# Patient Record
Sex: FEMALE | Race: WHITE | NOT HISPANIC OR LATINO | Employment: STUDENT | URBAN - METROPOLITAN AREA
[De-identification: names, ages, dates, MRNs, and addresses within clinical notes are randomized per-mention and may not be internally consistent; named-entity substitution may affect disease eponyms.]

---

## 2018-10-23 ENCOUNTER — HOSPITAL ENCOUNTER (EMERGENCY)
Facility: HOSPITAL | Age: 23
Discharge: HOME | End: 2018-10-23
Attending: EMERGENCY MEDICINE | Admitting: EMERGENCY MEDICINE

## 2018-10-23 ENCOUNTER — APPOINTMENT (OUTPATIENT)
Dept: GENERAL RADIOLOGY | Facility: HOSPITAL | Age: 23
End: 2018-10-23

## 2018-10-23 VITALS
BODY MASS INDEX: 31.18 KG/M2 | OXYGEN SATURATION: 96 % | SYSTOLIC BLOOD PRESSURE: 130 MMHG | WEIGHT: 176 LBS | RESPIRATION RATE: 18 BRPM | TEMPERATURE: 98.6 F | HEART RATE: 87 BPM | HEIGHT: 63 IN | DIASTOLIC BLOOD PRESSURE: 73 MMHG

## 2018-10-23 DIAGNOSIS — R07.89 ATYPICAL CHEST PAIN: Primary | ICD-10-CM

## 2018-10-23 DIAGNOSIS — R00.2 PALPITATIONS: ICD-10-CM

## 2018-10-23 DIAGNOSIS — H91.90 HEARING LOSS, UNSPECIFIED HEARING LOSS TYPE, UNSPECIFIED LATERALITY: ICD-10-CM

## 2018-10-23 LAB
ALBUMIN SERPL-MCNC: 4.72 G/DL (ref 3.2–4.8)
ALBUMIN/GLOB SERPL: 2.1 G/DL (ref 1.5–2.5)
ALP SERPL-CCNC: 59 U/L (ref 25–100)
ALT SERPL W P-5'-P-CCNC: 23 U/L (ref 7–40)
ANION GAP SERPL CALCULATED.3IONS-SCNC: 10 MMOL/L (ref 3–11)
AST SERPL-CCNC: 21 U/L (ref 0–33)
B-HCG UR QL: NEGATIVE
BASOPHILS # BLD AUTO: 0.02 10*3/MM3 (ref 0–0.2)
BASOPHILS NFR BLD AUTO: 0.2 % (ref 0–1)
BILIRUB SERPL-MCNC: 0.2 MG/DL (ref 0.3–1.2)
BILIRUB UR QL STRIP: NEGATIVE
BUN BLD-MCNC: 10 MG/DL (ref 9–23)
BUN/CREAT SERPL: 14.3 (ref 7–25)
CALCIUM SPEC-SCNC: 9.6 MG/DL (ref 8.7–10.4)
CHLORIDE SERPL-SCNC: 105 MMOL/L (ref 99–109)
CLARITY UR: CLEAR
CO2 SERPL-SCNC: 22 MMOL/L (ref 20–31)
COLOR UR: YELLOW
CREAT BLD-MCNC: 0.7 MG/DL (ref 0.6–1.3)
DEPRECATED RDW RBC AUTO: 40.2 FL (ref 37–54)
EOSINOPHIL # BLD AUTO: 0.1 10*3/MM3 (ref 0–0.3)
EOSINOPHIL NFR BLD AUTO: 1.2 % (ref 0–3)
ERYTHROCYTE [DISTWIDTH] IN BLOOD BY AUTOMATED COUNT: 12.8 % (ref 11.3–14.5)
GFR SERPL CREATININE-BSD FRML MDRD: 105 ML/MIN/1.73
GLOBULIN UR ELPH-MCNC: 2.3 GM/DL
GLUCOSE BLD-MCNC: 87 MG/DL (ref 70–100)
GLUCOSE UR STRIP-MCNC: NEGATIVE MG/DL
HCT VFR BLD AUTO: 38.2 % (ref 34.5–44)
HGB BLD-MCNC: 13 G/DL (ref 11.5–15.5)
HGB UR QL STRIP.AUTO: NEGATIVE
IMM GRANULOCYTES # BLD: 0.02 10*3/MM3 (ref 0–0.03)
IMM GRANULOCYTES NFR BLD: 0.2 % (ref 0–0.6)
INTERNAL NEGATIVE CONTROL: NEGATIVE
INTERNAL POSITIVE CONTROL: POSITIVE
KETONES UR QL STRIP: NEGATIVE
LEUKOCYTE ESTERASE UR QL STRIP.AUTO: NEGATIVE
LYMPHOCYTES # BLD AUTO: 2.4 10*3/MM3 (ref 0.6–4.8)
LYMPHOCYTES NFR BLD AUTO: 28.5 % (ref 24–44)
Lab: NORMAL
MCH RBC QN AUTO: 29.1 PG (ref 27–31)
MCHC RBC AUTO-ENTMCNC: 34 G/DL (ref 32–36)
MCV RBC AUTO: 85.5 FL (ref 80–99)
MONOCYTES # BLD AUTO: 0.52 10*3/MM3 (ref 0–1)
MONOCYTES NFR BLD AUTO: 6.2 % (ref 0–12)
NEUTROPHILS # BLD AUTO: 5.37 10*3/MM3 (ref 1.5–8.3)
NEUTROPHILS NFR BLD AUTO: 63.9 % (ref 41–71)
NITRITE UR QL STRIP: NEGATIVE
PH UR STRIP.AUTO: 5.5 [PH] (ref 5–8)
PLATELET # BLD AUTO: 278 10*3/MM3 (ref 150–450)
PMV BLD AUTO: 10.5 FL (ref 6–12)
POTASSIUM BLD-SCNC: 3.4 MMOL/L (ref 3.5–5.5)
PROT SERPL-MCNC: 7 G/DL (ref 5.7–8.2)
PROT UR QL STRIP: NEGATIVE
RBC # BLD AUTO: 4.47 10*6/MM3 (ref 3.89–5.14)
SODIUM BLD-SCNC: 137 MMOL/L (ref 132–146)
SP GR UR STRIP: 1.01 (ref 1–1.03)
TROPONIN I SERPL-MCNC: 0 NG/ML (ref 0–0.07)
TSH SERPL DL<=0.05 MIU/L-ACNC: 4.87 MIU/ML (ref 0.35–5.35)
UROBILINOGEN UR QL STRIP: NORMAL
WBC NRBC COR # BLD: 8.41 10*3/MM3 (ref 3.5–10.8)

## 2018-10-23 PROCEDURE — 93005 ELECTROCARDIOGRAM TRACING: CPT | Performed by: PHYSICIAN ASSISTANT

## 2018-10-23 PROCEDURE — 84484 ASSAY OF TROPONIN QUANT: CPT

## 2018-10-23 PROCEDURE — 81003 URINALYSIS AUTO W/O SCOPE: CPT | Performed by: PHYSICIAN ASSISTANT

## 2018-10-23 PROCEDURE — 85025 COMPLETE CBC W/AUTO DIFF WBC: CPT | Performed by: PHYSICIAN ASSISTANT

## 2018-10-23 PROCEDURE — 99283 EMERGENCY DEPT VISIT LOW MDM: CPT

## 2018-10-23 PROCEDURE — 84443 ASSAY THYROID STIM HORMONE: CPT | Performed by: PHYSICIAN ASSISTANT

## 2018-10-23 PROCEDURE — 81025 URINE PREGNANCY TEST: CPT | Performed by: PHYSICIAN ASSISTANT

## 2018-10-23 PROCEDURE — 71046 X-RAY EXAM CHEST 2 VIEWS: CPT

## 2018-10-23 PROCEDURE — 80053 COMPREHEN METABOLIC PANEL: CPT | Performed by: PHYSICIAN ASSISTANT

## 2018-10-23 RX ORDER — HYDROXYZINE HYDROCHLORIDE 10 MG/1
10 TABLET, FILM COATED ORAL EVERY 6 HOURS PRN
COMMUNITY

## 2018-10-24 ENCOUNTER — OFFICE VISIT (OUTPATIENT)
Dept: CARDIOLOGY | Facility: HOSPITAL | Age: 23
End: 2018-10-24

## 2018-10-24 ENCOUNTER — HOSPITAL ENCOUNTER (OUTPATIENT)
Dept: CARDIOLOGY | Facility: HOSPITAL | Age: 23
Discharge: HOME | End: 2018-10-24
Admitting: NURSE PRACTITIONER

## 2018-10-24 ENCOUNTER — HOSPITAL ENCOUNTER (OUTPATIENT)
Dept: CARDIOLOGY | Facility: HOSPITAL | Age: 23
Discharge: HOME | End: 2018-10-24

## 2018-10-24 VITALS
OXYGEN SATURATION: 97 % | DIASTOLIC BLOOD PRESSURE: 80 MMHG | BODY MASS INDEX: 31.29 KG/M2 | SYSTOLIC BLOOD PRESSURE: 122 MMHG | HEIGHT: 63 IN | HEART RATE: 99 BPM | RESPIRATION RATE: 16 BRPM | WEIGHT: 176.6 LBS | TEMPERATURE: 99.1 F

## 2018-10-24 DIAGNOSIS — R07.9 CHEST PAIN, UNSPECIFIED TYPE: ICD-10-CM

## 2018-10-24 DIAGNOSIS — R07.9 CHEST PAIN, UNSPECIFIED TYPE: Primary | ICD-10-CM

## 2018-10-24 DIAGNOSIS — R00.2 HEART PALPITATIONS: ICD-10-CM

## 2018-10-24 DIAGNOSIS — H91.93 BILATERAL HEARING LOSS, UNSPECIFIED HEARING LOSS TYPE: ICD-10-CM

## 2018-10-24 PROBLEM — H91.90 HEARING IMPAIRED: Status: ACTIVE | Noted: 2018-10-24

## 2018-10-24 PROCEDURE — 93005 ELECTROCARDIOGRAM TRACING: CPT | Performed by: NURSE PRACTITIONER

## 2018-10-24 PROCEDURE — 99204 OFFICE O/P NEW MOD 45 MIN: CPT | Performed by: NURSE PRACTITIONER

## 2018-10-24 PROCEDURE — 0296T HC EXT ECG > 48HR TO 21 DAY RCRD W/CONECT INTL RCRD: CPT

## 2018-10-24 PROCEDURE — 0298T HOLTER MONITOR - 72 HOUR UP TO 21 DAY: CPT | Performed by: INTERNAL MEDICINE

## 2018-10-24 PROCEDURE — 93010 ELECTROCARDIOGRAM REPORT: CPT | Performed by: INTERNAL MEDICINE

## 2018-10-24 RX ORDER — OMEPRAZOLE 20 MG/1
20 CAPSULE, DELAYED RELEASE ORAL DAILY
COMMUNITY
End: 2018-11-28

## 2018-10-24 NOTE — ED PROVIDER NOTES
"Subjective   Sindi Shepard is a 23 y.o.female who presents to the ED with c/o chest pain with onset 2 weeks ago. She reports that initially she developed left sided chest pain with palpitations and dizziness when she went home from [facility]. She also reported a \"weird sensation\" to her LUE. She went to [place] twice and her PCP for this complaint who performed blood tests and EKG. She had a normal work up and discharged home, however at her last   [facility] evaluation she was started on metoprolol 25 mg half tablet BID. She notes that her chest pain, palpitations, and dizziness have been persistent which prompted her visit to the ED. She states that she has hx of anxiety however does not feel like her current episode is anxiety related. She also complains of diarrhea, cough, and rhinorrhea but denies any fever, chills, urinary sx, N/V, abd pain, or any other complaints at this time. She has a follow up appointment with Dr. Briones, Cardiology on Nov 7, 2018. She denies any family hx of CAD and denies any history of congenital heart murmer, although her sister has a heart murmur. Her last menstrual cycle was 1.5 weeks ago.     The pt is deaf and a  was used.        History provided by:  Patient   used: Yes    Chest Pain   Pain location:  L chest  Pain quality: tightness    Pain radiates to:  L arm  Pain severity:  Moderate  Onset quality:  Sudden  Timing:  Constant  Progression:  Worsening  Chronicity:  New  Relieved by:  None tried  Worsened by:  Nothing  Ineffective treatments:  None tried  Associated symptoms: cough, dizziness and palpitations    Associated symptoms: no abdominal pain, no fever, no nausea and no vomiting        Review of Systems   Constitutional: Negative for chills and fever.   HENT: Positive for rhinorrhea.    Respiratory: Positive for cough.    Cardiovascular: Positive for chest pain and palpitations.   Gastrointestinal: Positive for diarrhea. Negative for " abdominal pain, nausea and vomiting.   Genitourinary: Negative for decreased urine volume, difficulty urinating, dyspareunia, dysuria, frequency, hematuria and urgency.   Neurological: Positive for dizziness.   All other systems reviewed and are negative.      Past Medical History:   Diagnosis Date   • Hearing impaired        Allergies   Allergen Reactions   • Bactrim [Sulfamethoxazole-Trimethoprim] Hives       Past Surgical History:   Procedure Laterality Date   • EAR MASTOIDECTOMY W/ COCHLEAR IMPLANT W/ LANDMARK Right     15 YRS AGO       Family History   Problem Relation Age of Onset   • Diabetes Paternal Grandfather    • Diabetes Paternal Aunt        Social History     Social History   • Marital status: Single     Social History Main Topics   • Smoking status: Never Smoker   • Smokeless tobacco: Never Used   • Alcohol use No   • Drug use: No     Other Topics Concern   • Not on file         Objective   Physical Exam   Constitutional: She is oriented to person, place, and time. She appears well-developed and well-nourished. No distress.   Pt is deaf,  was used.   HENT:   Head: Normocephalic and atraumatic.   Nose: Nose normal.   Eyes: Conjunctivae are normal. No scleral icterus.   Neck: Normal range of motion. Neck supple.   Cardiovascular: Normal rate, regular rhythm and normal heart sounds.    No murmur heard.  No ectopy.   Pulmonary/Chest: Effort normal and breath sounds normal. No respiratory distress.   Abdominal: Soft. Bowel sounds are normal. There is no tenderness.   Neurological: She is alert and oriented to person, place, and time.   Skin: Skin is warm and dry.   Psychiatric: She has a normal mood and affect. Her behavior is normal.   Nursing note and vitals reviewed.      Procedures         ED Course  ED Course as of Oct 23 2317   Tue Oct 23, 2018   2311 CBC and chemistries were within normal limits.  TSH is normal at 4.87.  Urinalysis shows no evidence of acute infectious process or  dehydration.  Urine hCG is negative.  Chest x-ray shows no acute cardiopulmonary process.  EKG shows normal sinus rhythm and there is no evidence of ectopy or arrhythmia.  Troponin is also 0.00.  I discussed all normal results with patient and recommend close follow-up at the cardiac event monitor clinic for echocardiogram and Holter monitoring.  This was translated to her with  using sign language.  Vital signs are stable.  Patient is agreeable with above treatment plan.  [FC]      ED Course User Index  [FC] Leeann Villanueva, KATIE     Recent Results (from the past 24 hour(s))   Comprehensive Metabolic Panel    Collection Time: 10/23/18  9:28 PM   Result Value Ref Range    Glucose 87 70 - 100 mg/dL    BUN 10 9 - 23 mg/dL    Creatinine 0.70 0.60 - 1.30 mg/dL    Sodium 137 132 - 146 mmol/L    Potassium 3.4 (L) 3.5 - 5.5 mmol/L    Chloride 105 99 - 109 mmol/L    CO2 22.0 20.0 - 31.0 mmol/L    Calcium 9.6 8.7 - 10.4 mg/dL    Total Protein 7.0 5.7 - 8.2 g/dL    Albumin 4.72 3.20 - 4.80 g/dL    ALT (SGPT) 23 7 - 40 U/L    AST (SGOT) 21 0 - 33 U/L    Alkaline Phosphatase 59 25 - 100 U/L    Total Bilirubin 0.2 (L) 0.3 - 1.2 mg/dL    eGFR Non African Amer 105 >60 mL/min/1.73    Globulin 2.3 gm/dL    A/G Ratio 2.1 1.5 - 2.5 g/dL    BUN/Creatinine Ratio 14.3 7.0 - 25.0    Anion Gap 10.0 3.0 - 11.0 mmol/L   TSH    Collection Time: 10/23/18  9:28 PM   Result Value Ref Range    TSH 4.870 0.350 - 5.350 mIU/mL   CBC Auto Differential    Collection Time: 10/23/18  9:28 PM   Result Value Ref Range    WBC 8.41 3.50 - 10.80 10*3/mm3    RBC 4.47 3.89 - 5.14 10*6/mm3    Hemoglobin 13.0 11.5 - 15.5 g/dL    Hematocrit 38.2 34.5 - 44.0 %    MCV 85.5 80.0 - 99.0 fL    MCH 29.1 27.0 - 31.0 pg    MCHC 34.0 32.0 - 36.0 g/dL    RDW 12.8 11.3 - 14.5 %    RDW-SD 40.2 37.0 - 54.0 fl    MPV 10.5 6.0 - 12.0 fL    Platelets 278 150 - 450 10*3/mm3    Neutrophil % 63.9 41.0 - 71.0 %    Lymphocyte % 28.5 24.0 - 44.0 %    Monocyte % 6.2 0.0 -  12.0 %    Eosinophil % 1.2 0.0 - 3.0 %    Basophil % 0.2 0.0 - 1.0 %    Immature Grans % 0.2 0.0 - 0.6 %    Neutrophils, Absolute 5.37 1.50 - 8.30 10*3/mm3    Lymphocytes, Absolute 2.40 0.60 - 4.80 10*3/mm3    Monocytes, Absolute 0.52 0.00 - 1.00 10*3/mm3    Eosinophils, Absolute 0.10 0.00 - 0.30 10*3/mm3    Basophils, Absolute 0.02 0.00 - 0.20 10*3/mm3    Immature Grans, Absolute 0.02 0.00 - 0.03 10*3/mm3   POC Troponin, Rapid    Collection Time: 10/23/18  9:35 PM   Result Value Ref Range    Troponin I 0.00 0.00 - 0.07 ng/mL   Urinalysis With Microscopic If Indicated (No Culture) - Urine, Clean Catch    Collection Time: 10/23/18  9:51 PM   Result Value Ref Range    Color, UA Yellow Yellow, Straw    Appearance, UA Clear Clear    pH, UA 5.5 5.0 - 8.0    Specific Gravity, UA 1.007 1.001 - 1.030    Glucose, UA Negative Negative    Ketones, UA Negative Negative    Bilirubin, UA Negative Negative    Blood, UA Negative Negative    Protein, UA Negative Negative    Leuk Esterase, UA Negative Negative    Nitrite, UA Negative Negative    Urobilinogen, UA 0.2 E.U./dL 0.2 - 1.0 E.U./dL   POCT Pregnancy, Urine    Collection Time: 10/23/18 10:03 PM   Result Value Ref Range    HCG, Urine, QL Negative Negative    Lot Number [id number]     Internal Positive Control Positive     Internal Negative Control Negative      Note: In addition to lab results from this visit, the labs listed above may include labs taken at another facility or during a different encounter within the last 24 hours. Please correlate lab times with ED admission and discharge times for further clarification of the services performed during this visit.    XR Chest 2 View   Final Result   No acute findings.       THIS DOCUMENT HAS BEEN ELECTRONICALLY SIGNED BY HOWIE TEJADA MD        Vitals:    10/23/18 2040   BP: 130/73   BP Location: Left arm   Patient Position: Sitting   Pulse: 87   Resp: 18   Temp: 98.6 °F (37 °C)   TempSrc: Oral   SpO2: 96%   Weight: 79.8 kg  "(176 lb)   Height: 160 cm (63\")     Medications - No data to display  ECG/EMG Results (last 24 hours)     ** No results found for the last 24 hours. **                        [facility]    Final diagnoses:   Atypical chest pain   Palpitations   Hearing loss, unspecified hearing loss type, unspecified laterality       Documentation assistance provided by cathy Mar.  Information recorded by the scribe was done at my direction and has been verified and validated by me.     Zander Mar  10/23/18 7034       Leeann Villanueva PA-C  10/23/18 1686    "

## 2018-10-24 NOTE — PROGRESS NOTES
Subjective:     Encounter Date:10/24/2018      Patient ID: Sindi Shepard is a 23 y.o. female.    Chief Complaint: palpitations and chest pain    History of Present Illness:  Ms. Shepard [facility] at the request of Dr. Fischer/ ANTHONY Reyna.  She presented to the ED yesterday evening due to 2 weeks duration of chest pain, palpitations, and dizziness.  She has been evaluated per [facility] twice and PCP.  Symptoms have persisted despite Rx for metoprolol 25 mg 1/2 tab BID.  She adds that she frequently feels a sharp pain in the left side of her chest when these sensations occur.  The metoprolol use (about 4 doses) has not changed her symptoms thus far.    No cardiac risk factors such as HTN, HLD, FMH of CAD, tobacco use.  Mild obesity w/ BMI 31.3    A  was used for today's visit.     Past Medical History:   Diagnosis Date   • Hearing impaired        Past Surgical History:   Procedure Laterality Date   • EAR MASTOIDECTOMY W/ COCHLEAR IMPLANT W/ LANDMARK Right     15 YRS AGO   • WISDOM TOOTH EXTRACTION         Social History     Social History   • Marital status: Single     Spouse name: N/A   • Number of children: N/A   • Years of education: N/A     Occupational History   • Full time student at [employer]      Social History Main Topics   • Smoking status: Never Smoker   • Smokeless tobacco: Never Used   • Alcohol use No   • Drug use: No   • Sexual activity: Not on file     Other Topics Concern   • Not on file     Social History Narrative    Caffeine: None           Family History   Problem Relation Age of Onset   • Diabetes Paternal Grandfather    • Diabetes Paternal Aunt    • Diabetes Father    • Hyperlipidemia Father    • Hypertension Father    • Anxiety disorder Father    • Cancer Paternal Grandmother        Review of Systems   Constitution: Positive for malaise/fatigue and weight loss (10 lbs). Negative for chills, decreased appetite, diaphoresis, fever,  "weakness, night sweats and weight gain.   HENT: Positive for nosebleeds. Negative for congestion, hearing loss and hoarse voice.    Eyes: Negative for blurred vision, visual disturbance and visual halos.   Cardiovascular: Positive for chest pain (4 on scale of 1 - 10), irregular heartbeat, near-syncope, orthopnea and palpitations. Negative for claudication, cyanosis, dyspnea on exertion, leg swelling, paroxysmal nocturnal dyspnea and syncope.   Respiratory: Positive for cough (few times). Negative for hemoptysis, shortness of breath, sleep disturbances due to breathing, snoring, sputum production and wheezing.    Hematologic/Lymphatic: Negative for bleeding problem. Does not bruise/bleed easily.   Skin: Negative for dry skin, itching and rash.   Musculoskeletal: Negative for arthritis, joint pain, joint swelling and myalgias.   Gastrointestinal: Positive for diarrhea. Negative for bloating, abdominal pain, constipation, flatus, heartburn, hematemesis, hematochezia, melena, nausea and vomiting.   Genitourinary: Negative for dysuria, frequency, hematuria, nocturia and urgency.   Neurological: Positive for dizziness (sometimes) and headaches. Negative for excessive daytime sleepiness, light-headedness and loss of balance.   Psychiatric/Behavioral: Negative for depression. The patient is nervous/anxious. The patient does not have insomnia.    Allergic/Immunologic:        Seasonal allergies - spring       Vitals:    10/24/18 1105 10/24/18 1108 10/24/18 1109   BP: 126/77 126/78 122/80   BP Location: Right arm Left arm Left arm   Patient Position: Sitting Sitting Standing   Pulse: 85  99   Resp: 16     Temp: 99.1 °F (37.3 °C)     TempSrc: Temporal Artery      SpO2: 97%     Weight: 80.1 kg (176 lb 9.6 oz)     Height: 160 cm (63\")           Objective:     Physical Exam   Constitutional: She is oriented to person, place, and time. She appears well-developed and well-nourished. No distress.   HENT:   Head: Normocephalic and " atraumatic.   Eyes: Pupils are equal, round, and reactive to light. Conjunctivae are normal. No scleral icterus.   Neck: Normal range of motion. Neck supple. No JVD present.   Cardiovascular: Normal rate, regular rhythm, normal heart sounds and intact distal pulses.  Exam reveals no gallop and no friction rub.    No murmur heard.  Pulmonary/Chest: Effort normal and breath sounds normal. No respiratory distress. She has no wheezes. She has no rales. She exhibits no tenderness.   Musculoskeletal: Normal range of motion. She exhibits no edema.   Neurological: She is alert and oriented to person, place, and time.   Skin: Skin is warm and dry.   Psychiatric: She has a normal mood and affect. Her behavior is normal. Judgment and thought content normal.   Vitals reviewed.      Lab Review: ER notes, labs, ekg's     Assessment/ Plan:                  Diagnosis Plan   1. Chest pain with palpitations - ECG 12 Lead remains NSR  - Ongoing symptoms despite multiple acute work up's    -Adult Transthoracic Echo     -Lipid Panel    -Treadmill Stress Test    -Holter Monitor - 14 days  - RTC in 3 weeks for follow up  - Discontinue Lopressor for now   2. Bilateral hearing loss  - Visit utilized

## 2018-10-24 NOTE — DISCHARGE INSTRUCTIONS
ER workup revealed normal EKG, labs, including thyroid study, and normal urinalysis.  Chest x-ray was also normal.  Recommend close follow-up at the cardiac event monitor clinic here at [facility].  I feel the patient would benefit from echocardiogram and Holter monitoring.  She needs to avoid caffeine.  Continue with all other current medical management.  Return if worsening symptoms.

## 2018-11-09 ENCOUNTER — HOSPITAL ENCOUNTER (OUTPATIENT)
Dept: CARDIOLOGY | Facility: HOSPITAL | Age: 23
Discharge: HOME | End: 2018-11-09

## 2018-11-09 ENCOUNTER — TELEPHONE (OUTPATIENT)
Dept: CARDIOLOGY | Facility: HOSPITAL | Age: 23
End: 2018-11-09

## 2018-11-09 ENCOUNTER — LAB (OUTPATIENT)
Dept: LAB | Facility: HOSPITAL | Age: 23
End: 2018-11-09

## 2018-11-09 ENCOUNTER — HOSPITAL ENCOUNTER (OUTPATIENT)
Dept: CARDIOLOGY | Facility: HOSPITAL | Age: 23
Discharge: HOME | End: 2018-11-09
Admitting: NURSE PRACTITIONER

## 2018-11-09 VITALS — HEIGHT: 63 IN | BODY MASS INDEX: 31.18 KG/M2 | WEIGHT: 176 LBS

## 2018-11-09 DIAGNOSIS — R07.9 CHEST PAIN, UNSPECIFIED TYPE: ICD-10-CM

## 2018-11-09 DIAGNOSIS — R00.2 HEART PALPITATIONS: ICD-10-CM

## 2018-11-09 DIAGNOSIS — R07.9 CHEST PAIN, UNSPECIFIED TYPE: Primary | ICD-10-CM

## 2018-11-09 LAB
ARTICHOKE IGE QN: 108 MG/DL (ref 0–130)
BH CV ECHO MEAS - AO MAX PG (FULL): 2.4 MMHG
BH CV ECHO MEAS - AO MAX PG: 7 MMHG
BH CV ECHO MEAS - AO ROOT AREA (BSA CORRECTED): 1.3
BH CV ECHO MEAS - AO ROOT AREA: 4.6 CM^2
BH CV ECHO MEAS - AO ROOT DIAM: 2.4 CM
BH CV ECHO MEAS - AO V2 MAX: 129 CM/SEC
BH CV ECHO MEAS - AVA(V,A): 2.7 CM^2
BH CV ECHO MEAS - AVA(V,D): 2.7 CM^2
BH CV ECHO MEAS - BSA(HAYCOCK): 1.9 M^2
BH CV ECHO MEAS - BSA: 1.8 M^2
BH CV ECHO MEAS - BZI_BMI: 31.2 KILOGRAMS/M^2
BH CV ECHO MEAS - BZI_METRIC_HEIGHT: 160 CM
BH CV ECHO MEAS - BZI_METRIC_WEIGHT: 79.8 KG
BH CV ECHO MEAS - EDV(CUBED): 72.9 ML
BH CV ECHO MEAS - EDV(MOD-SP2): 71 ML
BH CV ECHO MEAS - EDV(MOD-SP4): 68 ML
BH CV ECHO MEAS - EDV(TEICH): 77.6 ML
BH CV ECHO MEAS - EF(CUBED): 63.7 %
BH CV ECHO MEAS - EF(MOD-BP): 66 %
BH CV ECHO MEAS - EF(MOD-SP2): 64.8 %
BH CV ECHO MEAS - EF(MOD-SP4): 66.2 %
BH CV ECHO MEAS - EF(TEICH): 55.6 %
BH CV ECHO MEAS - ESV(CUBED): 26.4 ML
BH CV ECHO MEAS - ESV(MOD-SP2): 25 ML
BH CV ECHO MEAS - ESV(MOD-SP4): 23 ML
BH CV ECHO MEAS - ESV(TEICH): 34.4 ML
BH CV ECHO MEAS - FS: 28.7 %
BH CV ECHO MEAS - IVS/LVPW: 0.96
BH CV ECHO MEAS - IVSD: 0.78 CM
BH CV ECHO MEAS - LA DIMENSION: 2.7 CM
BH CV ECHO MEAS - LA/AO: 1.1
BH CV ECHO MEAS - LAD MAJOR: 4.3 CM
BH CV ECHO MEAS - LAT PEAK E' VEL: 12.7 CM/SEC
BH CV ECHO MEAS - LATERAL E/E' RATIO: 7
BH CV ECHO MEAS - LV DIASTOLIC VOL/BSA (35-75): 37.1 ML/M^2
BH CV ECHO MEAS - LV MASS(C)D: 99.9 GRAMS
BH CV ECHO MEAS - LV MASS(C)DI: 54.5 GRAMS/M^2
BH CV ECHO MEAS - LV MAX PG: 4.6 MMHG
BH CV ECHO MEAS - LV MEAN PG: 2.8 MMHG
BH CV ECHO MEAS - LV SYSTOLIC VOL/BSA (12-30): 12.6 ML/M^2
BH CV ECHO MEAS - LV V1 MAX: 107.6 CM/SEC
BH CV ECHO MEAS - LV V1 MEAN: 77.2 CM/SEC
BH CV ECHO MEAS - LV V1 VTI: 19.3 CM
BH CV ECHO MEAS - LVIDD: 4.2 CM
BH CV ECHO MEAS - LVIDS: 3 CM
BH CV ECHO MEAS - LVLD AP2: 7.3 CM
BH CV ECHO MEAS - LVLD AP4: 7.4 CM
BH CV ECHO MEAS - LVLS AP2: 6 CM
BH CV ECHO MEAS - LVLS AP4: 6.5 CM
BH CV ECHO MEAS - LVOT AREA (M): 3.1 CM^2
BH CV ECHO MEAS - LVOT AREA: 3.3 CM^2
BH CV ECHO MEAS - LVOT DIAM: 2 CM
BH CV ECHO MEAS - LVPWD: 0.81 CM
BH CV ECHO MEAS - MED PEAK E' VEL: 9.9 CM/SEC
BH CV ECHO MEAS - MEDIAL E/E' RATIO: 9
BH CV ECHO MEAS - MV A MAX VEL: 50.8 CM/SEC
BH CV ECHO MEAS - MV DEC TIME: 0.21 SEC
BH CV ECHO MEAS - MV E MAX VEL: 90.3 CM/SEC
BH CV ECHO MEAS - MV E/A: 1.8
BH CV ECHO MEAS - PA ACC SLOPE: 472.3 CM/SEC^2
BH CV ECHO MEAS - PA ACC TIME: 0.16 SEC
BH CV ECHO MEAS - PA MAX PG: 5.2 MMHG
BH CV ECHO MEAS - PA PR(ACCEL): 5.3 MMHG
BH CV ECHO MEAS - PA V2 MAX: 113.2 CM/SEC
BH CV ECHO MEAS - SI(CUBED): 25.4 ML/M^2
BH CV ECHO MEAS - SI(LVOT): 34.7 ML/M^2
BH CV ECHO MEAS - SI(MOD-SP2): 25.1 ML/M^2
BH CV ECHO MEAS - SI(MOD-SP4): 24.6 ML/M^2
BH CV ECHO MEAS - SI(TEICH): 23.6 ML/M^2
BH CV ECHO MEAS - SV(CUBED): 46.5 ML
BH CV ECHO MEAS - SV(LVOT): 63.5 ML
BH CV ECHO MEAS - SV(MOD-SP2): 46 ML
BH CV ECHO MEAS - SV(MOD-SP4): 45 ML
BH CV ECHO MEAS - SV(TEICH): 43.2 ML
BH CV ECHO MEAS - TAPSE (>1.6): 1.9 CM2
BH CV ECHO MEASUREMENTS AVERAGE E/E' RATIO: 7.99
BH CV STRESS BP STAGE 1: NORMAL
BH CV STRESS BP STAGE 2: NORMAL
BH CV STRESS BP STAGE 3: NORMAL
BH CV STRESS BP STAGE 4: NORMAL
BH CV STRESS DURATION MIN STAGE 1: 3
BH CV STRESS DURATION MIN STAGE 2: 3
BH CV STRESS DURATION MIN STAGE 3: 3
BH CV STRESS DURATION MIN STAGE 4: 1
BH CV STRESS DURATION SEC STAGE 1: 0
BH CV STRESS DURATION SEC STAGE 2: 0
BH CV STRESS DURATION SEC STAGE 3: 0
BH CV STRESS DURATION SEC STAGE 4: 13
BH CV STRESS GRADE STAGE 1: 10
BH CV STRESS GRADE STAGE 2: 12
BH CV STRESS GRADE STAGE 3: 14
BH CV STRESS GRADE STAGE 4: 16
BH CV STRESS HR STAGE 1: 127
BH CV STRESS HR STAGE 2: 142
BH CV STRESS HR STAGE 3: 171
BH CV STRESS HR STAGE 4: 181
BH CV STRESS METS STAGE 1: 5
BH CV STRESS METS STAGE 2: 7.5
BH CV STRESS METS STAGE 3: 10
BH CV STRESS METS STAGE 4: 13.5
BH CV STRESS PROTOCOL 1: NORMAL
BH CV STRESS RECOVERY BP: NORMAL MMHG
BH CV STRESS RECOVERY HR: 112 BPM
BH CV STRESS SPEED STAGE 1: 1.7
BH CV STRESS SPEED STAGE 2: 2.5
BH CV STRESS SPEED STAGE 3: 3.4
BH CV STRESS SPEED STAGE 4: 4.2
BH CV STRESS STAGE 1: 1
BH CV STRESS STAGE 2: 2
BH CV STRESS STAGE 3: 3
BH CV STRESS STAGE 4: 4
BH CV XLRA - RV BASE: 3 CM
BH CV XLRA - RV LENGTH: 7.2 CM
BH CV XLRA - RV MID: 2.5 CM
BH CV XLRA - TDI S': 11.8 CM/SEC
CHOLEST SERPL-MCNC: 154 MG/DL (ref 0–200)
HDLC SERPL-MCNC: 36 MG/DL (ref 40–60)
LEFT ATRIUM VOLUME INDEX: 15.8 ML/M^2
MAXIMAL PREDICTED HEART RATE: 198 BPM
PERCENT MAX PREDICTED HR: 92.93 %
STRESS BASELINE BP: NORMAL MMHG
STRESS BASELINE HR: 101 BPM
STRESS PERCENT HR: 109 %
STRESS POST ESTIMATED WORKLOAD: 12.1 METS
STRESS POST EXERCISE DUR MIN: 10 MIN
STRESS POST EXERCISE DUR SEC: 13 SEC
STRESS POST PEAK BP: NORMAL MMHG
STRESS POST PEAK HR: 184 BPM
STRESS TARGET HR: 168 BPM
TRIGL SERPL-MCNC: 201 MG/DL (ref 0–150)

## 2018-11-09 PROCEDURE — 93017 CV STRESS TEST TRACING ONLY: CPT

## 2018-11-09 PROCEDURE — 93306 TTE W/DOPPLER COMPLETE: CPT | Performed by: INTERNAL MEDICINE

## 2018-11-09 PROCEDURE — 36415 COLL VENOUS BLD VENIPUNCTURE: CPT

## 2018-11-09 PROCEDURE — 93306 TTE W/DOPPLER COMPLETE: CPT

## 2018-11-09 PROCEDURE — 93018 CV STRESS TEST I&R ONLY: CPT | Performed by: INTERNAL MEDICINE

## 2018-11-09 PROCEDURE — 80061 LIPID PANEL: CPT

## 2018-11-09 NOTE — TELEPHONE ENCOUNTER
LM (via  service) to say that GXT and echo were good/ low risk.  Please keep clinic follow up on 11/13.  Call the day prior to ensure cardiac event monitor report will be available for review.

## 2018-11-28 ENCOUNTER — OFFICE VISIT (OUTPATIENT)
Dept: CARDIOLOGY | Facility: HOSPITAL | Age: 23
End: 2018-11-28

## 2018-11-28 VITALS
SYSTOLIC BLOOD PRESSURE: 122 MMHG | RESPIRATION RATE: 16 BRPM | DIASTOLIC BLOOD PRESSURE: 79 MMHG | TEMPERATURE: 98 F | BODY MASS INDEX: 31.36 KG/M2 | HEIGHT: 63 IN | HEART RATE: 80 BPM | OXYGEN SATURATION: 98 % | WEIGHT: 177 LBS

## 2018-11-28 DIAGNOSIS — H91.93 BILATERAL HEARING LOSS, UNSPECIFIED HEARING LOSS TYPE: ICD-10-CM

## 2018-11-28 DIAGNOSIS — R07.9 CHEST PAIN, UNSPECIFIED TYPE: ICD-10-CM

## 2018-11-28 DIAGNOSIS — G47.20 SLEEP PATTERN DISTURBANCE: ICD-10-CM

## 2018-11-28 DIAGNOSIS — R00.2 HEART PALPITATIONS: Primary | ICD-10-CM

## 2018-11-28 PROCEDURE — 99212 OFFICE O/P EST SF 10 MIN: CPT | Performed by: NURSE PRACTITIONER

## 2018-11-28 RX ORDER — PANTOPRAZOLE SODIUM 20 MG/1
20 TABLET, DELAYED RELEASE ORAL DAILY
COMMUNITY

## 2018-11-28 NOTE — PROGRESS NOTES
"  Subjective:     Encounter Date:11/28/2018      Patient ID: Sindi Shepard is a 23 y.o. female.    Chief Complaint: follow up chest pain and palpitations    History of Present Illness:  Ms. Shepard returns to follow up on recent cardiac testing.  Since our last visit, she states overall she is feeling better/  fewer palpitations. Her chest tightness has been related to episodes of awakening from sleep feeling a choking sensation or acid reflux sensation in her throat.  When she awakens in this manner, she also sometimes feels her heart \"pounding\".  She has been having sleep disturbance for about the past year.      Past Medical History:   Diagnosis Date   • Hearing impaired        Past Surgical History:   Procedure Laterality Date   • EAR MASTOIDECTOMY W/ COCHLEAR IMPLANT W/ LANDMARK Right     15 YRS AGO   • WISDOM TOOTH EXTRACTION         Social History     Socioeconomic History   • Marital status: Single     Spouse name: Not on file   • Number of children: Not on file   • Years of education: Not on file   • Highest education level: Not on file   Social Needs   • Financial resource strain: Not hard at all   • Food insecurity - worry: Never true   • Food insecurity - inability: Never true   • Transportation needs - medical: No   • Transportation needs - non-medical: No   Occupational History   • Occupation: Full time student at [employer]   Tobacco Use   • Smoking status: Never Smoker   • Smokeless tobacco: Never Used   Substance and Sexual Activity   • Alcohol use: No   • Drug use: No   • Sexual activity: Not on file   Other Topics Concern   • Not on file   Social History Narrative    Caffeine: None       Family History   Problem Relation Age of Onset   • Diabetes Paternal Grandfather    • Diabetes Paternal Aunt    • Diabetes Father    • Hyperlipidemia Father    • Hypertension Father    • Anxiety disorder Father    • Cancer Paternal Grandmother        Review of Systems   Constitution: Positive for malaise/fatigue and " "weight loss (10-15 in 2 months). Negative for chills, decreased appetite, diaphoresis, fever, weakness, night sweats and weight gain.   HENT: Negative for congestion, hearing loss, hoarse voice and nosebleeds.    Eyes: Negative for blurred vision, visual disturbance and visual halos.   Cardiovascular: Positive for chest pain and irregular heartbeat. Negative for claudication, cyanosis, dyspnea on exertion, leg swelling, near-syncope, orthopnea, palpitations, paroxysmal nocturnal dyspnea and syncope.   Respiratory: Negative for cough, hemoptysis, shortness of breath, sleep disturbances due to breathing, snoring, sputum production and wheezing.    Hematologic/Lymphatic: Negative for bleeding problem. Does not bruise/bleed easily.   Skin: Negative for dry skin, itching and rash.   Musculoskeletal: Negative for arthritis, joint pain, joint swelling and myalgias.   Gastrointestinal: Positive for heartburn. Negative for bloating, abdominal pain, constipation, diarrhea, flatus, hematemesis, hematochezia, melena, nausea and vomiting.   Genitourinary: Negative for dysuria, frequency, hematuria, nocturia and urgency.   Neurological: Negative for excessive daytime sleepiness, dizziness, headaches, light-headedness and loss of balance.   Psychiatric/Behavioral: Negative for depression. The patient has insomnia. The patient is not nervous/anxious.    Allergic/Immunologic:        Seasonal allergies     Vitals:    11/28/18 1017   BP: 122/79   BP Location: Right arm   Patient Position: Sitting   Cuff Size: Adult   Pulse: 80   Resp: 16   Temp: 98 °F (36.7 °C)   TempSrc: Temporal   SpO2: 98%   Weight: 80.3 kg (177 lb)   Height: 160 cm (62.99\")       Objective:     Physical Exam   Constitutional: She is oriented to person, place, and time. She appears well-developed and well-nourished.   HENT:   Head: Normocephalic and atraumatic.   Hearing impaired.   present   Eyes: Conjunctivae are normal. Pupils are equal, round, and " reactive to light.   Neck: Normal range of motion. Neck supple. No JVD present.   Cardiovascular: Normal rate, regular rhythm, normal heart sounds and intact distal pulses. Exam reveals no gallop and no friction rub.   No murmur heard.  Pulmonary/Chest: Effort normal and breath sounds normal. No respiratory distress. She has no wheezes. She has no rales.   Musculoskeletal: Normal range of motion. She exhibits no edema.   Neurological: She is alert and oriented to person, place, and time. No cranial nerve deficit.   Skin: Skin is warm and dry.   Psychiatric: She has a normal mood and affect. Her behavior is normal. Thought content normal.   Vitals reviewed.      Lab Review: Echocardiogram 11/9/18 Interpretation Summary     · Left ventricular systolic function is normal. Calculated EF = 66%. Estimated EF appears to be in the range of 61 - 65%. Normal left ventricular cavity size and wall thickness noted. All left ventricular wall segments contract normally. Left ventricular diastolic function is normal.     Treadmill stress test 11/9/18 Interpretation Summary     · No chest pain or discomfort  · Expected exercise time 11:40 actual time 10:13, test stopped at patient request due to fatigue  · No ECG evidence of myocardial ischemia.Negative clinical evidence of myocardial ischemia. Findings consistent with a normal ECG stress test.        Extended holter summary: Sinus rhythm/ sinus tachycardia and rare ventricular/supraventricular ectopy <1%     Assessment/ Plan:           Diagnosis Plan   1. Palpitations/chest tightness - Low risk treadmill GXT  - Structurally normal echocardiogram  - Symptoms nocturnal/ related to possible sleep breathing disorder  - Ambulatory Referral to Sleep Medicine  - In the interim, elevate HOB    3. Bilateral hearing loss - Today's visit facilitated by .    No further follow up scheduled @ [facility] unless new symptoms develop.

## 2018-11-30 ENCOUNTER — CONSULT (OUTPATIENT)
Dept: SLEEP MEDICINE | Facility: HOSPITAL | Age: 23
End: 2018-11-30

## 2018-11-30 VITALS
HEART RATE: 87 BPM | DIASTOLIC BLOOD PRESSURE: 70 MMHG | BODY MASS INDEX: 31.33 KG/M2 | OXYGEN SATURATION: 98 % | HEIGHT: 63 IN | SYSTOLIC BLOOD PRESSURE: 120 MMHG | WEIGHT: 176.8 LBS

## 2018-11-30 DIAGNOSIS — G47.33 OBSTRUCTIVE SLEEP APNEA, ADULT: ICD-10-CM

## 2018-11-30 DIAGNOSIS — G47.21 CIRCADIAN RHYTHM SLEEP DISORDER, DELAYED SLEEP PHASE TYPE: ICD-10-CM

## 2018-11-30 DIAGNOSIS — R00.2 HEART PALPITATIONS: ICD-10-CM

## 2018-11-30 DIAGNOSIS — R06.83 SNORING: Primary | ICD-10-CM

## 2018-11-30 DIAGNOSIS — E66.09 CLASS 1 OBESITY DUE TO EXCESS CALORIES WITHOUT SERIOUS COMORBIDITY WITH BODY MASS INDEX (BMI) OF 31.0 TO 31.9 IN ADULT: ICD-10-CM

## 2018-11-30 PROCEDURE — 99204 OFFICE O/P NEW MOD 45 MIN: CPT | Performed by: INTERNAL MEDICINE

## 2018-11-30 NOTE — PROGRESS NOTES
Subjective   Sindi Shepard is a 23 y.o. female is being seen for consultation today at the request of JANET Owens for the evaluation of snoring and disturbed sleep.she has significant hearing impairment and is here with an     History of Present Illness  Patient states she only sleeps for a few hours each night.  She tries to sleep and sometimes has difficulty and then awakens frequently and is restless during the night.  She has awakened with her heart racing.  She says she often doesn't breathe well she's trying to sleep on her back.  She has a history also of gastroesophageal reflux disease.  She was seen by cardiology who did not think she had a significant cardiac issues and referred her here for possible disturbed sleep.  She says she sometimes feels though she stops breathing at night.  Her father has a history of snoring.  The patient is been noted to have occasional snoring but is not been noted to have apneas.  She has awakened gasping for breath.  She's not rested in the morning.  She has a morning headache 1 day per week.  She will fall asleep if sitting quietly during the day.  She denies any problems while driving.    She has a history of slight snoring says she often sleeps on her stomach.  She feels as though she's choking if she is on her back.  She has occasional coughing and a dry mouth at night.  She denies ever breaking her nose.  She does have trouble breathing through her nose.  She has a history of reflux and is on medications.  She denies hypnagogic hallucination or sleep paralysis.  She denies kicking or jerking her legs at night.  She denies having chronic pain.  She admits to gaining 20 pounds in the past year.    She goes to bed generally about midnight.  She will fall asleep after she awakens 7-9 time she thinks during the night and gets 3-5 hours of sleep arising at 9 AM to noon.  She often does not feel rest  She denies any history of hypertension diabetes  coronary artery disease.  She has been told she had anxiety.  Allergies   Allergen Reactions   • Bactrim [Sulfamethoxazole-Trimethoprim] Hives          Current Outpatient Medications:   •  hydrOXYzine (ATARAX) 10 MG tablet, Take 10 mg by mouth Every 6 (Six) Hours As Needed for Anxiety. 1-3 tablets every 6 hours as needed , Disp: , Rfl:   •  ibuprofen (ADVIL,MOTRIN) 200 MG tablet, Take 200 mg by mouth As Needed for mild pain (1-3)., Disp: , Rfl:   •  pantoprazole (PROTONIX) 20 MG EC tablet, Take 20 mg by mouth Daily., Disp: , Rfl:     Social History    Tobacco Use      Smoking status: Never Smoker      Smokeless tobacco: Never Used       Social History     Substance and Sexual Activity   Alcohol Use No       Caffeine: without    Past Medical History:   Diagnosis Date   • GERD (gastroesophageal reflux disease) 10/24/18   • Hearing impaired        Past Surgical History:   Procedure Laterality Date   • EAR MASTOIDECTOMY W/ COCHLEAR IMPLANT W/ LANDMARK Right     15 YRS AGO   • WISDOM TOOTH EXTRACTION         Family History   Problem Relation Age of Onset   • Diabetes Paternal Grandfather    • Cancer Paternal Grandfather         Lung cancer   • Diabetes Paternal Aunt    • Diabetes Father    • Hyperlipidemia Father    • Hypertension Father    • Anxiety disorder Father    • Cancer Paternal Grandmother    • Hypertension Mother        The following portions of the patient's history were reviewed and updated as appropriate: allergies, current medications, past family history, past medical history, past social history, past surgical history and problem list.    Review of Systems   Constitutional: Positive for unexpected weight change.   HENT: Positive for hearing loss.    Eyes: Positive for visual disturbance.   Respiratory: Positive for shortness of breath.    Cardiovascular: Positive for chest pain and palpitations.   Gastrointestinal: Negative.    Endocrine: Positive for polydipsia and polyuria.   Genitourinary: Positive for  "frequency.   Musculoskeletal: Negative.    Skin: Negative.    Allergic/Immunologic: Negative.    Neurological: Positive for dizziness, light-headedness, numbness and headaches.   Hematological: Negative.    Psychiatric/Behavioral: The patient is nervous/anxious.        Objective     /70   Pulse 87   Ht 160 cm (63\")   Wt 80.2 kg (176 lb 12.8 oz)   SpO2 98%   BMI 31.32 kg/m²      Physical Exam   Constitutional: She is oriented to person, place, and time. She appears well-developed and well-nourished.   She is obese.   HENT:   Head: Normocephalic and atraumatic.   She has Mallampati class to anatomy.   Eyes: EOM are normal. Pupils are equal, round, and reactive to light.   Neck: Normal range of motion. Neck supple.   Cardiovascular: Normal rate, regular rhythm and normal heart sounds.   Pulmonary/Chest: Effort normal and breath sounds normal.   Abdominal: Soft. Bowel sounds are normal.   Musculoskeletal: Normal range of motion. She exhibits no edema.   Neurological: She is alert and oriented to person, place, and time. A sensory deficit is present.   she is significantly hearing impaired   Skin: Skin is warm and dry.   Psychiatric: She has a normal mood and affect. Her behavior is normal.         Assessment/Plan   Sindi was seen today for sleeping problem.    Diagnoses and all orders for this visit:    Snoring  -     Polysomnography 4 or More Parameters; Future    Obstructive sleep apnea, adult  -     Polysomnography 4 or More Parameters; Future    Heart palpitations    Circadian rhythm sleep disorder, delayed sleep phase type    Class 1 obesity due to excess calories without serious comorbidity with body mass index (BMI) of 31.0 to 31.9 in adult    patient has a history of snoring and disturbed sleep and feels though she is choking in the supine position.  I think she has a fairly good story for obstructive sleep apnea.  We will plan to proceed to REM.  I've discussed possible therapies including CPAP, " weight control, oral appliances, and surgery.  We've also discussed the long-term consequences of untreated obstructive sleep apnea.  The patient seems to have significant delayed sleep phase syndrome as well.  We have discussed possible measures to improve this.  She is try to improve her sleep hygiene.  This may need to be addressed after her study.  She is to return then after her study.  She is encouraged to achieve ideal body weight.  She is encouraged to avoid alcohol and sedatives close to bedtime.  She is encouraged practice lateral position sleep.         Donell Slade MD [facility]  Sleep Medicine  Pulmonary and Critical Care Medicine

## 2018-12-12 ENCOUNTER — HOSPITAL ENCOUNTER (OUTPATIENT)
Dept: SLEEP MEDICINE | Facility: HOSPITAL | Age: 23
Discharge: HOME | End: 2018-12-12
Attending: INTERNAL MEDICINE | Admitting: INTERNAL MEDICINE

## 2018-12-12 VITALS
SYSTOLIC BLOOD PRESSURE: 116 MMHG | HEIGHT: 63 IN | DIASTOLIC BLOOD PRESSURE: 72 MMHG | HEART RATE: 81 BPM | WEIGHT: 177.03 LBS | OXYGEN SATURATION: 98 % | BODY MASS INDEX: 31.37 KG/M2

## 2018-12-12 DIAGNOSIS — G47.33 OBSTRUCTIVE SLEEP APNEA, ADULT: ICD-10-CM

## 2018-12-12 DIAGNOSIS — R06.83 SNORING: ICD-10-CM

## 2018-12-12 PROCEDURE — 95810 POLYSOM 6/> YRS 4/> PARAM: CPT

## 2018-12-12 PROCEDURE — 95810 POLYSOM 6/> YRS 4/> PARAM: CPT | Performed by: INTERNAL MEDICINE

## 2018-12-13 PROBLEM — G47.33 OSA (OBSTRUCTIVE SLEEP APNEA): Status: ACTIVE | Noted: 2018-12-13

## 2018-12-17 ENCOUNTER — OFFICE VISIT (OUTPATIENT)
Dept: SLEEP MEDICINE | Facility: HOSPITAL | Age: 23
End: 2018-12-17

## 2018-12-17 VITALS
BODY MASS INDEX: 31.15 KG/M2 | SYSTOLIC BLOOD PRESSURE: 131 MMHG | WEIGHT: 175.8 LBS | HEART RATE: 90 BPM | HEIGHT: 63 IN | OXYGEN SATURATION: 98 % | DIASTOLIC BLOOD PRESSURE: 81 MMHG

## 2018-12-17 DIAGNOSIS — G47.8 NON-RESTORATIVE SLEEP: ICD-10-CM

## 2018-12-17 DIAGNOSIS — G47.10 HYPERSOMNIA: ICD-10-CM

## 2018-12-17 DIAGNOSIS — R06.83 SNORING: ICD-10-CM

## 2018-12-17 DIAGNOSIS — R29.818 SUSPECTED SLEEP APNEA: ICD-10-CM

## 2018-12-17 DIAGNOSIS — F51.04 PSYCHOPHYSIOLOGICAL INSOMNIA: Primary | ICD-10-CM

## 2018-12-17 PROCEDURE — 99213 OFFICE O/P EST LOW 20 MIN: CPT | Performed by: NURSE PRACTITIONER

## 2018-12-17 RX ORDER — ZOLPIDEM TARTRATE 5 MG/1
TABLET ORAL
Qty: 2 TABLET | Refills: 0 | Status: SHIPPED | OUTPATIENT
Start: 2018-12-17

## 2018-12-17 NOTE — PROGRESS NOTES
Subjective: Follow-up        Chief Complaint:   Chief Complaint   Patient presents with   • Follow-up       HPI:    Sindi Shepard is a 23 y.o. female here for follow-up of study results.  Patient was originally seen here in consult 11/30/18 for snoring and frequent awakenings.  She did undergo sleep study on 12/12/18.  I did take patient along time to go to sleep in her sleep was very fragmented.  She was on her found for 4 hours initially at the beginning of her study.  She showed very little iN3 sleep but was found to have some obstructive respiratory events.  Her total sleep time was less than 2 hours.  Patient states she did not have a good night and felt very uncomfortable.  She had trouble becoming used to the wires being around her.        Current medications are:   Current Outpatient Medications:   •  hydrOXYzine (ATARAX) 10 MG tablet, Take 10 mg by mouth Every 6 (Six) Hours As Needed for Anxiety. 1-3 tablets every 6 hours as needed , Disp: , Rfl:   •  ibuprofen (ADVIL,MOTRIN) 200 MG tablet, Take 200 mg by mouth As Needed for mild pain (1-3)., Disp: , Rfl:   •  pantoprazole (PROTONIX) 20 MG EC tablet, Take 20 mg by mouth Daily., Disp: , Rfl:   •  zolpidem (AMBIEN) 5 MG tablet, 1-2 po qhs when instructed bl sleep MD, Disp: 2 tablet, Rfl: 0.      The patient's relevant past medical, surgical, family and social history were reviewed and updated in Epic as appropriate.       Review of Systems   HENT: Positive for hearing loss.    Eyes: Positive for visual disturbance.   Respiratory: Positive for shortness of breath.    Cardiovascular: Positive for chest pain and palpitations.   Endocrine: Positive for polydipsia and polyuria.   Genitourinary: Positive for frequency.   Neurological: Positive for dizziness, numbness and headaches.   Psychiatric/Behavioral: Positive for sleep disturbance. The patient is nervous/anxious.    All other systems reviewed and are negative.        Objective:    Physical Exam    Constitutional: She is oriented to person, place, and time. She appears well-developed and well-nourished.   HENT:   Head: Normocephalic and atraumatic.   Mouth/Throat: Oropharynx is clear and moist.   Mallampati 3 anatomy   Eyes: Conjunctivae are normal.   Neck: Neck supple. No thyromegaly present.   Cardiovascular: Normal rate and regular rhythm.   Pulmonary/Chest: Effort normal and breath sounds normal.   Lymphadenopathy:     She has no cervical adenopathy.   Neurological: She is alert and oriented to person, place, and time.   Skin: Skin is warm and dry.   Psychiatric: She has a normal mood and affect. Her behavior is normal. Judgment and thought content normal.   Nursing note and vitals reviewed.        ASSESSMENT/PLAN    Sindi was seen today for follow-up.    Diagnoses and all orders for this visit:    Psychophysiological insomnia  -     zolpidem (AMBIEN) 5 MG tablet; 1-2 po qhs when instructed bl sleep MD  -     Polysomnography 4 or More Parameters; Future    Suspected sleep apnea  -     Polysomnography 4 or More Parameters; Future    Snoring  -     Polysomnography 4 or More Parameters; Future    Non-restorative sleep  -     Polysomnography 4 or More Parameters; Future    Hypersomnia   -     Polysomnography 4 or More Parameters; Future            1. Counseled patient regarding multimodal approach with healthy nutrition, healthy sleep, regular physical activity, social activities, counseling, and medications. Encouraged to practice lateral sleep position. Avoid alcohol and sedatives close to bedtime.  2.   Due to findings as noted above we will repeat the sleep study.  I have provided her with Ambien 5 mg 1-2 when instructed per sleep staff.  #2 pills with no refills her Angus is compliant.  She does know to refrain from found use after the study has been started and we will need to secure an  to do sign language for us.  I have reviewed the results of my evaluation and impression and discussed  my recommendations in detail with the patient.      Signed by  Kaycee Craven, APRN    December 17, 2018      CC: Vicki De Leon MD          No ref. provider found

## 2019-01-07 ENCOUNTER — HOSPITAL ENCOUNTER (OUTPATIENT)
Dept: SLEEP MEDICINE | Facility: HOSPITAL | Age: 24
End: 2019-01-07

## 2019-01-28 ENCOUNTER — HOSPITAL ENCOUNTER (OUTPATIENT)
Dept: SLEEP MEDICINE | Facility: HOSPITAL | Age: 24
End: 2019-01-28

## 2019-02-20 ENCOUNTER — APPOINTMENT (OUTPATIENT)
Dept: SLEEP MEDICINE | Facility: HOSPITAL | Age: 24
End: 2019-02-20